# Patient Record
Sex: MALE | Race: WHITE | Employment: FULL TIME | URBAN - METROPOLITAN AREA
[De-identification: names, ages, dates, MRNs, and addresses within clinical notes are randomized per-mention and may not be internally consistent; named-entity substitution may affect disease eponyms.]

---

## 2024-01-18 ENCOUNTER — APPOINTMENT (OUTPATIENT)
Dept: GENERAL RADIOLOGY | Age: 44
End: 2024-01-18
Payer: COMMERCIAL

## 2024-01-18 ENCOUNTER — HOSPITAL ENCOUNTER (EMERGENCY)
Age: 44
Discharge: HOME | End: 2024-01-18
Attending: EMERGENCY MEDICINE
Payer: COMMERCIAL

## 2024-01-18 VITALS
TEMPERATURE: 98.3 F | RESPIRATION RATE: 20 BRPM | HEIGHT: 73 IN | BODY MASS INDEX: 31.06 KG/M2 | HEART RATE: 99 BPM | OXYGEN SATURATION: 97 % | WEIGHT: 234.35 LBS | DIASTOLIC BLOOD PRESSURE: 90 MMHG | SYSTOLIC BLOOD PRESSURE: 122 MMHG

## 2024-01-18 DIAGNOSIS — S93.412A SPRAIN OF CALCANEOFIBULAR LIGAMENT OF LEFT ANKLE, INITIAL ENCOUNTER: Primary | ICD-10-CM

## 2024-01-18 PROCEDURE — 99283 EMERGENCY DEPT VISIT LOW MDM: CPT

## 2024-01-18 PROCEDURE — 73610 X-RAY EXAM OF ANKLE: CPT

## 2024-01-18 RX ORDER — CETIRIZINE HYDROCHLORIDE 10 MG/1
10 TABLET ORAL DAILY
COMMUNITY

## 2024-01-18 ASSESSMENT — PAIN DESCRIPTION - ORIENTATION: ORIENTATION: LEFT

## 2024-01-18 ASSESSMENT — PAIN SCALES - GENERAL: PAINLEVEL_OUTOF10: 0

## 2024-01-18 ASSESSMENT — PAIN - FUNCTIONAL ASSESSMENT: PAIN_FUNCTIONAL_ASSESSMENT: 0-10

## 2024-01-18 ASSESSMENT — PAIN DESCRIPTION - DESCRIPTORS: DESCRIPTORS: ACHING

## 2024-01-18 NOTE — ED NOTES
Discharge instructions reveiwed.  Patient verbalized understanding.  Patient will follow up with orthopedic.  Patient denied need for crutches.

## 2024-01-18 NOTE — ED TRIAGE NOTES
Patient came to ER with complaints of left ankle pain.  Patient stated jumped and landed in a hole and heard a pop and has pain with walking and weight bearing.

## 2024-01-18 NOTE — ED PROVIDER NOTES
unlabored. Speaking comfortably in full sentences.  EXTREMITIES: Moderate soft tissue edema over the left lateral malleolus and inferior to it.  Patient has point tenderness over the left calcaneofibular tendon.  No palpable deformity of the ankle or foot.  SKIN: Warm and dry.  NEUROLOGICAL: Alert and oriented.  Normal sensation to light touch throughout the affected left foot.  Normal motor function of all toes in the left foot    RADIOLOGY  XR ANKLE LEFT (MIN 3 VIEWS)   Final Result   No radiographic evidence of acute osseous abnormality.                ED COURSE/MDM  Patient seen and evaluated.  X-ray reveals no acute fracture, indicating patient's injury is likely an ankle sprain.  Given his point tenderness, I suspect he sprained the calcaneal fibular ligament.  We fitted him with a walker boot and crutches.  I advised him on rest, ice, compression, elevation.  I advised him to follow-up with orthopedics if he is continuing to have pain and swelling in 1 week.  Orthopedic referral provided today. I do feel patient can be safely discharged to home.  Discussed findings and plan with patient.  He acknowledges understanding and is in agreement      Patient Referrals:  [facility]  [address] [address]  [phone]    As needed, If symptoms worsen or new symptoms develop    Loretta Koch MD  [address] [facility]  [address]  [facility] [address]  [phone]    In 1 week  Orthopedics      Discharge Medications:  Discharge Medication List as of 1/18/2024 11:07 AM          FINAL IMPRESSION  1. Sprain of calcaneofibular ligament of left ankle, initial encounter        Blood pressure (!) 122/90, pulse 99, temperature 98.3 °F (36.8 °C), temperature source Tympanic, resp. rate 20, height 1.85 m (6' 0.84\"), weight 106.3 kg (234 lb 5.6 oz), SpO2 97 %.     DISPOSITION  Patient was discharged to home in good condition.    This chart was generated using the Dragon dictation

## 2024-01-31 ENCOUNTER — OFFICE VISIT (OUTPATIENT)
Dept: ORTHOPEDIC SURGERY | Age: 44
End: 2024-01-31
Payer: COMMERCIAL

## 2024-01-31 VITALS — BODY MASS INDEX: 31.15 KG/M2 | WEIGHT: 230 LBS | HEIGHT: 72 IN

## 2024-01-31 DIAGNOSIS — S93.492A SPRAIN OF ANTERIOR TALOFIBULAR LIGAMENT OF LEFT ANKLE, INITIAL ENCOUNTER: Primary | ICD-10-CM

## 2024-01-31 PROCEDURE — 99203 OFFICE O/P NEW LOW 30 MIN: CPT | Performed by: ORTHOPAEDIC SURGERY

## 2024-01-31 NOTE — PROGRESS NOTES
male who presents today in no acute distress, awake, alert, and oriented.  He is well dressed, nourished and  groomed.  Patient with normal affect.  Height is  1.829 m (6'), weight is 104.3 kg (230 lb).  Resting respiratory rate is 16.     Examination of the gait, showed that the patient walks WB left leg .  Examination of both ankles showing a decreased range of motion of the left ankle compare to the other side because of pain.  There is mild swelling that can be seen, no ecchymosis over lateral side of the left ankle.    He has intact sensation and good pedal pulses. He has significant tenderness on deep palpation over the left ankle ATF. The ankles are stable to drawer test bilaterally, equally.  Ankle reflex 1+ bilaterally.     IMAGING:  Xray's dated 1/18/2024 from [facility],  were reviewed, 3 views of the left ankle, and showed no acute fracture. Ankle mortise in anatomic position.     IMPRESSION: Left ankle sprain.    PLAN:  I assured the patient that the xray is negative for acute fracture. The xray findings were reviewed with the patient and explained to his that the pain is 2ry to ankle sprain, and that it should continue to improve the next 3-4 weeks.  He can be WBAT in a boot and can discontinue the boot this week, and avoid heavy impact acitivity and work on peroneal muscle strength. F/U in 4 weeks, PT if needed.      Loretta Koch MD

## 2024-03-06 ENCOUNTER — OFFICE VISIT (OUTPATIENT)
Dept: ORTHOPEDIC SURGERY | Age: 44
End: 2024-03-06
Payer: COMMERCIAL

## 2024-03-06 VITALS — WEIGHT: 229.94 LBS | HEIGHT: 72 IN | BODY MASS INDEX: 31.14 KG/M2

## 2024-03-06 DIAGNOSIS — S93.492A SPRAIN OF ANTERIOR TALOFIBULAR LIGAMENT OF LEFT ANKLE, INITIAL ENCOUNTER: Primary | ICD-10-CM

## 2024-03-06 PROCEDURE — 99214 OFFICE O/P EST MOD 30 MIN: CPT | Performed by: ORTHOPAEDIC SURGERY

## 2024-03-06 NOTE — PROGRESS NOTES
CHIEF COMPLAINT: Left ankle pain/ Ankle ATFL sprain.    DATE OF INJURY: 1/17/2024    HISTORY:  Mr. Zamora 43 y.o.  male presents today for f/u evaluation of a left ankle injury which occurred when he was playing basketball with his sone and left foot caught in a hole rolling his ankle. He is complaining of lateral ankle pain and also burning sensation lateral heel area. He went to [facility] because of the pain. He was told that he has a sprain, boot was applied and asked to f/u with Orthopedics. He reports today mild pain.Rates pain a 3/10 VAS when he is not wearing the boot.  Pain increase with walking and decrease with rest and elevation. No numbness or tingling sensation, and no other complaint.  He works in IT, sitting job.     Past Medical History:   Diagnosis Date    Seasonal allergies         No past surgical history on file.     Social History     Socioeconomic History    Marital status:      Spouse name: Not on file    Number of children: Not on file    Years of education: Not on file    Highest education level: Not on file   Occupational History    Not on file   Tobacco Use    Smoking status: Never    Smokeless tobacco: Never   Vaping Use    Vaping Use: Never used   Substance and Sexual Activity    Alcohol use: Never    Drug use: Never    Sexual activity: Yes     Partners: Female   Other Topics Concern    Not on file   Social History Narrative    Not on file     Social Determinants of Health     Financial Resource Strain: Not on file   Food Insecurity: Not on file   Transportation Needs: Not on file   Physical Activity: Not on file   Stress: Not on file   Social Connections: Not on file   Intimate Partner Violence: Not on file   Housing Stability: Not on file        No family history on file.     Pertinent items are noted in HPI  Review of systems reviewed from Patient History Form and available in the patient's chart under the Media tab.       PHYSICAL EXAM:  Mr. Zamora is a very

## 2024-03-11 ENCOUNTER — HOSPITAL ENCOUNTER (OUTPATIENT)
Dept: PHYSICAL THERAPY | Age: 44
Setting detail: THERAPIES SERIES
Discharge: HOME | End: 2024-03-11
Payer: COMMERCIAL

## 2024-03-11 DIAGNOSIS — S93.492A SPRAIN OF ANTERIOR TALOFIBULAR LIGAMENT OF LEFT ANKLE, INITIAL ENCOUNTER: Primary | ICD-10-CM

## 2024-03-11 PROCEDURE — 97161 PT EVAL LOW COMPLEX 20 MIN: CPT

## 2024-03-11 PROCEDURE — 97530 THERAPEUTIC ACTIVITIES: CPT

## 2024-03-11 PROCEDURE — 97112 NEUROMUSCULAR REEDUCATION: CPT

## 2024-03-11 NOTE — PLAN OF CARE
thumb      OBJECTIVE EXAMINATION   Posture: WFL  Functional Mobility/Transfers:  Independent    Palpation: tenderness noted L ATFL, peroneals      Gait: decreased stride length, lat sway, decreased push off;  sharp pain with partial squat    Single Limb Stance;  (Left):  lat sway, fair-             Right:good    ROM (LEFT) RIGHT   HIP Flex WFL WFL   Knee ext WFL WFL   Knee Flex WFL WFL   Ankle DF 15 20   Ankle PF 40 60   Ankle In - Forefoot 30 30   Ankle In-  Rearfoot 25 20   Ankle Ev - Forefoot 15 30   Ankle Ev - Rearfoot 5 10   Strength  (LEFT) RIGHT   HIP Flexors 5/5 5/5   Knee EXT (quad) 5/5 5/5   Knee Flex (HS) 5/5 5/5   Ankle DF 4-/5 5/5   Ankle PF 2+/5* 5/5   Ankle Inv 3/5 5/5   Ankle EV 2+/5* 5/5        Muscle Control     Gluteals Poor+ Fair   Quadriceps Fair Fair   Hip Adductors Fair- Fair          Sensation:    [x]Dermatomes/Myotomes intact to Light Touch       Joint mobility:    [x]Normal    []Hypo   []Hyper    Orthopedic Special Tests:  DATE:          (Left) Right Comments   Anterior Drawer N N    Posterior Drawer N N    Varus Stress N N    Valgus Stress N N    *N = Normal, A = Abnormal         Exercises/Interventions     Therapeutic Ex (64432)  resistance Sets/time Reps Notes/Cues/Progressions   NuStep       Calf Stretch                                   Manual Intervention (71726)  TIME     Soft Tissue Mobilization                                   NMR re-education (28376) resistance Sets/time Reps CUES NEEDED   Home Exercise Program  X 15 min  See below                               Therapeutic Activity (05308)  Sets/time     Patient education  X 10 min  See below                                 Modalities:    No modalities applied this session    Education:  Discussed at length anatomy and biomechanics of the ankle, muscle reeducation, motor recruitment.   Home Exercise Program:  Patient instructed in TA sets, glut sets, quad sets, isometric hip add, ankle DF, PF, IN, EV, calf stretch with towel ;

## 2024-03-13 ENCOUNTER — HOSPITAL ENCOUNTER (OUTPATIENT)
Dept: PHYSICAL THERAPY | Age: 44
Setting detail: THERAPIES SERIES
Discharge: HOME | End: 2024-03-13
Payer: COMMERCIAL

## 2024-03-13 PROCEDURE — 97140 MANUAL THERAPY 1/> REGIONS: CPT

## 2024-03-13 PROCEDURE — 97110 THERAPEUTIC EXERCISES: CPT

## 2024-03-13 NOTE — FLOWSHEET NOTE
[facility]- Outpatient Rehabilitation and Therapy [address] [place] [address] office: [phone] fax: [phone]       Physical Therapy: TREATMENT/PROGRESS NOTE   Patient: Adelso Zamora (43 y.o. male)   Examination Date: 2024   :  1980 MRN: [id number]   Visit #: 2   Insurance Allowable Auth Needed   BMN []Yes    [x]No    Insurance: Payor: CIGNA / Plan: CIGNA / Product Type: *No Product type* / PT Insurance Information: Cigna, 30% coinsurance, visits based on medical necessity, does not require prior authorization   Insurance ID: [id number] - (Commercial)  Secondary Insurance (if applicable):    Treatment Diagnosis:     ICD-10-CM    1. Sprain of anterior talofibular ligament of left ankle, initial encounter  S93.492A          Medical Diagnosis:  Sprain of anterior talofibular ligament of left ankle, initial encounter [S93.492A]   Referring Physician: Loretta Koch MD  PCP: No primary care provider on file.       Plan of care signed (Y/N): cosign received    Date of Patient follow up with Physician: 24     Progress Report/POC: no  POC update due: (10 visits /OR AUTH LIMITS, whichever is less)  2024                                             Precautions/ Contra-indications:           Latex allergy:  NO  Pacemaker:    NO  Contraindications for Manipulation: None  Date of Surgery:   Other:    Red Flags:  None    C-SSRS Triggered by Intake questionnaire:   [x] No, Questionnaire did not trigger screening.   [] Yes, Patient intake triggered further evaluation      [] C-SSRS Screening completed  [] PCP notified via Plan of Care  [] Emergency services notified     Preferred Language for Healthcare:   [x] English       [] other:    SUBJECTIVE EXAMINATION     Patient stated complaint: Patient reports he was shooting basketball in back yard, came down on L foot, rolled it, heard a pop, was unable to bear weight on it.  Patient went to ED, put in boot, saw Dr. Koch, karolina with

## 2024-03-19 ENCOUNTER — HOSPITAL ENCOUNTER (OUTPATIENT)
Dept: PHYSICAL THERAPY | Age: 44
Setting detail: THERAPIES SERIES
Discharge: HOME | End: 2024-03-19
Payer: COMMERCIAL

## 2024-03-19 PROCEDURE — 97112 NEUROMUSCULAR REEDUCATION: CPT

## 2024-03-19 PROCEDURE — 97140 MANUAL THERAPY 1/> REGIONS: CPT

## 2024-03-19 PROCEDURE — 97110 THERAPEUTIC EXERCISES: CPT

## 2024-03-19 NOTE — FLOWSHEET NOTE
[facility]- Outpatient Rehabilitation and Therapy [address] [place] [address] office: [phone] fax: [phone]       Physical Therapy: TREATMENT/PROGRESS NOTE   Patient: Adelso Zamora (43 y.o. male)   Examination Date: 2024   :  1980 MRN: [id number]   Visit #: 3   Insurance Allowable Auth Needed   BMN []Yes    [x]No    Insurance: Payor: CIGNA / Plan: CIGNA / Product Type: *No Product type* / PT Insurance Information: Cigna, 30% coinsurance, visits based on medical necessity, does not require prior authorization   Insurance ID: [id number] - (Commercial)  Secondary Insurance (if applicable):    Treatment Diagnosis:     ICD-10-CM    1. Sprain of anterior talofibular ligament of left ankle, initial encounter  S93.492A          Medical Diagnosis:  Sprain of anterior talofibular ligament of left ankle, initial encounter [S93.492A]   Referring Physician: Loretta Koch MD  PCP: No primary care provider on file.       Plan of care signed (Y/N): cosign received    Date of Patient follow up with Physician: 24     Progress Report/POC: no  POC update due: (10 visits /OR AUTH LIMITS, whichever is less)  2024                                             Precautions/ Contra-indications:           Latex allergy:  NO  Pacemaker:    NO  Contraindications for Manipulation: None  Date of Surgery:   Other:    Red Flags:  None    C-SSRS Triggered by Intake questionnaire:   [x] No, Questionnaire did not trigger screening.   [] Yes, Patient intake triggered further evaluation      [] C-SSRS Screening completed  [] PCP notified via Plan of Care  [] Emergency services notified     Preferred Language for Healthcare:   [x] English       [] other:    SUBJECTIVE EXAMINATION     Patient stated complaint: Patient reports he was shooting basketball in back yard, came down on L foot, rolled it, heard a pop, was unable to bear weight on it.  Patient went to ED, put in boot, saw Dr. Koch, karolina with

## 2024-03-22 ENCOUNTER — HOSPITAL ENCOUNTER (OUTPATIENT)
Dept: PHYSICAL THERAPY | Age: 44
Setting detail: THERAPIES SERIES
Discharge: HOME | End: 2024-03-22
Payer: COMMERCIAL

## 2024-03-22 PROCEDURE — 97112 NEUROMUSCULAR REEDUCATION: CPT

## 2024-03-22 PROCEDURE — 97140 MANUAL THERAPY 1/> REGIONS: CPT

## 2024-03-22 PROCEDURE — 97110 THERAPEUTIC EXERCISES: CPT

## 2024-03-22 NOTE — FLOWSHEET NOTE
[facility]- Outpatient Rehabilitation and Therapy [address] [place] [address] office: [phone] fax: [phone]       Physical Therapy: TREATMENT/PROGRESS NOTE   Patient: Adelso Zamora (43 y.o. male)   Examination Date: 2024   :  1980 MRN: [id number]   Visit #: 4   Insurance Allowable Auth Needed   BMN []Yes    [x]No    Insurance: Payor: CIGNA / Plan: CIGNA / Product Type: *No Product type* / PT Insurance Information: Cigna, 30% coinsurance, visits based on medical necessity, does not require prior authorization   Insurance ID: [id number] - (Commercial)  Secondary Insurance (if applicable):    Treatment Diagnosis:     ICD-10-CM    1. Sprain of anterior talofibular ligament of left ankle, initial encounter  S93.492A          Medical Diagnosis:  Sprain of anterior talofibular ligament of left ankle, initial encounter [S93.492A]   Referring Physician: Loretta Koch MD  PCP: No primary care provider on file.       Plan of care signed (Y/N): cosign received    Date of Patient follow up with Physician: 24     Progress Report/POC: no  POC update due: (10 visits /OR AUTH LIMITS, whichever is less)  2024                                             Precautions/ Contra-indications:           Latex allergy:  NO  Pacemaker:    NO  Contraindications for Manipulation: None  Date of Surgery:   Other:    Red Flags:  None    C-SSRS Triggered by Intake questionnaire:   [x] No, Questionnaire did not trigger screening.   [] Yes, Patient intake triggered further evaluation      [] C-SSRS Screening completed  [] PCP notified via Plan of Care  [] Emergency services notified     Preferred Language for Healthcare:   [x] English       [] other:    SUBJECTIVE EXAMINATION     Patient stated complaint: Patient reports he was shooting basketball in back yard, came down on L foot, rolled it, heard a pop, was unable to bear weight on it.  Patient went to ED, put in boot, saw Dr. Koch, karolina with

## 2024-03-26 ENCOUNTER — HOSPITAL ENCOUNTER (OUTPATIENT)
Dept: PHYSICAL THERAPY | Age: 44
Setting detail: THERAPIES SERIES
Discharge: HOME | End: 2024-03-26
Payer: COMMERCIAL

## 2024-03-26 PROCEDURE — 97112 NEUROMUSCULAR REEDUCATION: CPT

## 2024-03-26 PROCEDURE — 97140 MANUAL THERAPY 1/> REGIONS: CPT

## 2024-03-26 PROCEDURE — 97110 THERAPEUTIC EXERCISES: CPT

## 2024-03-26 NOTE — FLOWSHEET NOTE
Met: [] Adjusted  Patient will have a decrease in pain to 0/10 to help facilitate improvement in movement, function, and ADLs as indicated by functional deficits.   Status: [] Progressing: [] Met: [] Not Met: [] Adjusted    Long Term Goals: To be achieved in: 4-6 weeks  Disability index score of 7% or less for the WOMAC to assist with return top prior level of function.   Status: [] Progressing: [] Met: [] Not Met: [] Adjusted  Improve ankle AROM to WFL to allow for proper joint functioning as indicated by patients functional deficits.  Status: [] Progressing: [] Met: [] Not Met: [] Adjusted  Pt to improve strength to show motor control/activation of proximal hip, TrA/abdominal, posterior chain LE, quadriceps, gastroc/soleus, and hamstrings to facilitate functional mobility and ADLs.    Status: [] Progressing: [] Met: [] Not Met: [] Adjusted  Patient will return to  Usual recreational activities, squatting, heavy home activities, walk 1 mile, hopping, and running  without increased symptoms or restriction to work towards return to prior level of function.        Status: [] Progressing: [] Met: [] Not Met: [] Adjusted  Patient will resume normal work/leisure activities without pain.            Status: [] Progressing: [] Met: [] Not Met: [] Adjusted    TREATMENT PLAN     Plan: Monitor response. Tband INV/EVR    Electronically Signed by Eli Chandra PTA   Date: 03/26/2024     Note: Portions of this note have been templated and/or copied from initial evaluation, reassessments and prior notes for documentation efficiency.

## 2024-03-29 ENCOUNTER — HOSPITAL ENCOUNTER (OUTPATIENT)
Dept: PHYSICAL THERAPY | Age: 44
Setting detail: THERAPIES SERIES
Discharge: HOME | End: 2024-03-29
Payer: COMMERCIAL

## 2024-03-29 PROCEDURE — 97110 THERAPEUTIC EXERCISES: CPT

## 2024-03-29 PROCEDURE — 97112 NEUROMUSCULAR REEDUCATION: CPT

## 2024-03-29 PROCEDURE — 97140 MANUAL THERAPY 1/> REGIONS: CPT

## 2024-03-29 NOTE — FLOWSHEET NOTE
DF, PF, IN, EV  X 30 sec each X 3 reclined   AROM>ARROM - DF, PF, IN, Ev Min resist X 1 each X 10 reclined          NMR re-education (18763) resistance Sets/time Reps CUES NEEDED   Home Exercise Program    See below   Leg press     Heel raises 120#  80# 3  3 X10  x10    BAPS  cw/ccw L2  x15    Meena: INV/EVR    Neutral and end range 5\" 5x ea           Therapeutic Activity (09291)  Sets/time     Patient education    See below                        Kinesiotape ant/lat malleolus     3/29: Assess     Modalities:    No modalities applied this session    Education:  Discussed at length anatomy and biomechanics of the ankle, muscle reeducation, motor recruitment.   Home Exercise Program:  Patient instructed in TA sets, glut sets, quad sets, isometric hip add, ankle DF, PF, IN, EV, calf stretch with towel ; written instructions with pictures issued, patient able to demonstrate exercises.   3/29: Inversion and eversion with red Tband        ASSESSMENT   Assessment:   Adelso Zamora is a 43 y.o. male presenting today to Outpatient PT with signs and symptoms consistent with sprain L ankle and muscle imbalance of the core and B LEs .     Pt. presents with the functional impairments and activity limitations listed below and would benefit from Outpatient PT to address the below impairments as well as improve pain, and restore function.     Today's Assessment: Patient tolerated treatment well. Mild/mod tenderness distal peroneals and ant tib    Medical Necessity Documentation:  I certify that this patient meets the below criteria necessary for medical necessity for care and/or justification of therapy services:  The patient has functional impairments and/or activity limitations and would benefit from continued outpatient therapy services to address the deficits outlined in the patients goals  The patient has a musculoskeletal condition(s) with a corresponding ICD-10 code that is of complexity and severity that require skilled

## 2024-04-01 ENCOUNTER — HOSPITAL ENCOUNTER (OUTPATIENT)
Dept: PHYSICAL THERAPY | Age: 44
Setting detail: THERAPIES SERIES
Discharge: HOME | End: 2024-04-01
Payer: COMMERCIAL

## 2024-04-01 PROCEDURE — 97110 THERAPEUTIC EXERCISES: CPT

## 2024-04-01 PROCEDURE — 97140 MANUAL THERAPY 1/> REGIONS: CPT

## 2024-04-01 PROCEDURE — 97112 NEUROMUSCULAR REEDUCATION: CPT

## 2024-04-01 NOTE — FLOWSHEET NOTE
[facility]- Outpatient Rehabilitation and Therapy [address] [place] [address] office: [phone] fax: [phone]       Physical Therapy: TREATMENT/PROGRESS NOTE   Patient: Adelso Zamora (43 y.o. male)   Examination Date: 2024   :  1980 MRN: [id number]   Visit #: 7   Insurance Allowable Auth Needed   BMN []Yes    [x]No    Insurance: Payor: CIGNA / Plan: CIGNA / Product Type: *No Product type* / PT Insurance Information: Cigna, 30% coinsurance, visits based on medical necessity, does not require prior authorization   Insurance ID: [id number] - (Commercial)  Secondary Insurance (if applicable):    Treatment Diagnosis:     ICD-10-CM    1. Sprain of anterior talofibular ligament of left ankle, initial encounter  S93.492A          Medical Diagnosis:  Sprain of anterior talofibular ligament of left ankle, initial encounter [S93.492A]   Referring Physician: Loretta Koch MD  PCP: No primary care provider on file.       Plan of care signed (Y/N): cosign received    Date of Patient follow up with Physician: 24     Progress Report/POC: no  POC update due: (10 visits /OR AUTH LIMITS, whichever is less)  2024                                             Precautions/ Contra-indications:           Latex allergy:  NO  Pacemaker:    NO  Contraindications for Manipulation: None  Date of Surgery:   Other:    Red Flags:  None    C-SSRS Triggered by Intake questionnaire:   [x] No, Questionnaire did not trigger screening.   [] Yes, Patient intake triggered further evaluation      [] C-SSRS Screening completed  [] PCP notified via Plan of Care  [] Emergency services notified     Preferred Language for Healthcare:   [x] English       [] other:    SUBJECTIVE EXAMINATION     Patient stated complaint: Patient reports he was shooting basketball in back yard, came down on L foot, rolled it, heard a pop, was unable to bear weight on it.  Patient went to ED, put in boot, saw Dr. Koch, karolina with

## 2024-04-04 ENCOUNTER — HOSPITAL ENCOUNTER (OUTPATIENT)
Dept: PHYSICAL THERAPY | Age: 44
Setting detail: THERAPIES SERIES
Discharge: HOME | End: 2024-04-04
Payer: COMMERCIAL

## 2024-04-04 PROCEDURE — 97140 MANUAL THERAPY 1/> REGIONS: CPT

## 2024-04-04 PROCEDURE — 97110 THERAPEUTIC EXERCISES: CPT

## 2024-04-04 PROCEDURE — 97112 NEUROMUSCULAR REEDUCATION: CPT

## 2024-04-04 NOTE — FLOWSHEET NOTE
flexibility, following either an injury or surgery.   (40142) NEUROMUSCULAR RE-EDUCATION - Therapeutic procedure, 1 or more areas, each 15 minutes; neuromuscular reeducation of movement, balance, coordination, kinesthetic sense, posture, and/or proprioception for sitting and/or standing activities  (59005) MANUAL THERAPY -  Manual therapy techniques, 1 or more regions, each 15 minutes (Mobilization/manipulation, manual lymphatic drainage, manual traction) for the purpose of modulating pain, promoting relaxation,  increasing ROM, reducing/eliminating soft tissue swelling/inflammation/restriction, improving soft tissue extensibility and allowing for proper ROM for normal function with self care, mobility, lifting and ambulation      GOALS     Patient stated goal: \"Get back to normal\"  Status:  [] Progressing: [] Met: [] Not Met: [] Adjusted    Therapist goals for Patient:   Short Term Goals: To be achieved in: 2 weeks  Independent in HEP and progression per patient tolerance, in order to progress toward full function and prevent re-injury.    Status: [] Progressing: [] Met: [] Not Met: [] Adjusted  Patient will have a decrease in pain to 0/10 to help facilitate improvement in movement, function, and ADLs as indicated by functional deficits.   Status: [] Progressing: [] Met: [] Not Met: [] Adjusted    Long Term Goals: To be achieved in: 4-6 weeks  Disability index score of 7% or less for the WOMAC to assist with return top prior level of function.   Status: [] Progressing: [] Met: [] Not Met: [] Adjusted  Improve ankle AROM to WFL to allow for proper joint functioning as indicated by patients functional deficits.  Status: [] Progressing: [] Met: [] Not Met: [] Adjusted  Pt to improve strength to show motor control/activation of proximal hip, TrA/abdominal, posterior chain LE, quadriceps, gastroc/soleus, and hamstrings to facilitate functional mobility and ADLs.    Status: [] Progressing: [] Met: [] Not Met: []

## 2024-04-08 ENCOUNTER — HOSPITAL ENCOUNTER (OUTPATIENT)
Dept: PHYSICAL THERAPY | Age: 44
Setting detail: THERAPIES SERIES
Discharge: HOME | End: 2024-04-08
Payer: COMMERCIAL

## 2024-04-08 PROCEDURE — 97112 NEUROMUSCULAR REEDUCATION: CPT

## 2024-04-08 PROCEDURE — 97140 MANUAL THERAPY 1/> REGIONS: CPT

## 2024-04-08 PROCEDURE — 97110 THERAPEUTIC EXERCISES: CPT

## 2024-04-08 NOTE — FLOWSHEET NOTE
[facility]- Outpatient Rehabilitation and Therapy [address] [place] [address] office: [phone] fax: [phone]       Physical Therapy: TREATMENT/PROGRESS NOTE   Patient: Adelso Zamora (43 y.o. male)   Examination Date: 2024   :  1980 MRN: [id number]   Visit #: 9   Insurance Allowable Auth Needed   BMN []Yes    [x]No    Insurance: Payor: CIGNA / Plan: CIGNA / Product Type: *No Product type* / PT Insurance Information: Cigna, 30% coinsurance, visits based on medical necessity, does not require prior authorization   Insurance ID: [id number] - (Commercial)  Secondary Insurance (if applicable):    Treatment Diagnosis:     ICD-10-CM    1. Sprain of anterior talofibular ligament of left ankle, initial encounter  S93.492A          Medical Diagnosis:  Sprain of anterior talofibular ligament of left ankle, initial encounter [S93.492A]   Referring Physician: Loretta Koch MD  PCP: No primary care provider on file.       Plan of care signed (Y/N): cosign received    Date of Patient follow up with Physician: 24     Progress Report/POC: no  POC update due: (10 visits /OR AUTH LIMITS, whichever is less)  2024                                             Precautions/ Contra-indications:           Latex allergy:  NO  Pacemaker:    NO  Contraindications for Manipulation: None  Date of Surgery:   Other:    Red Flags:  None    C-SSRS Triggered by Intake questionnaire:   [x] No, Questionnaire did not trigger screening.   [] Yes, Patient intake triggered further evaluation      [] C-SSRS Screening completed  [] PCP notified via Plan of Care  [] Emergency services notified     Preferred Language for Healthcare:   [x] English       [] other:    SUBJECTIVE EXAMINATION     Patient stated complaint: Patient reports he was shooting basketball in back yard, came down on L foot, rolled it, heard a pop, was unable to bear weight on it.  Patient went to ED, put in boot, saw Dr. Koch, karolina with

## 2024-04-11 ENCOUNTER — HOSPITAL ENCOUNTER (OUTPATIENT)
Dept: PHYSICAL THERAPY | Age: 44
Setting detail: THERAPIES SERIES
Discharge: HOME | End: 2024-04-11
Payer: COMMERCIAL

## 2024-04-11 PROCEDURE — 97110 THERAPEUTIC EXERCISES: CPT

## 2024-04-11 PROCEDURE — 97140 MANUAL THERAPY 1/> REGIONS: CPT

## 2024-04-11 PROCEDURE — 97112 NEUROMUSCULAR REEDUCATION: CPT

## 2024-04-11 NOTE — FLOWSHEET NOTE
gastroc/soleus, and hamstrings to facilitate functional mobility and ADLs.    Status: [] Progressing: [] Met: [] Not Met: [] Adjusted  Patient will return to  Usual recreational activities, squatting, heavy home activities, walk 1 mile, hopping, and running  without increased symptoms or restriction to work towards return to prior level of function.        Status: [] Progressing: [] Met: [] Not Met: [] Adjusted  Patient will resume normal work/leisure activities without pain.            Status: [] Progressing: [] Met: [] Not Met: [] Adjusted    TREATMENT PLAN     Plan: Monitor response. Initiate ankle alphabet if able for HEP.  Continue to focus on soft tissue mobility.  Electronically Signed by Eli Chandra, [id number]   Date: 04/11/2024     Note: Portions of this note have been templated and/or copied from initial evaluation, reassessments and prior notes for documentation efficiency.

## 2024-04-15 ENCOUNTER — HOSPITAL ENCOUNTER (OUTPATIENT)
Dept: PHYSICAL THERAPY | Age: 44
Setting detail: THERAPIES SERIES
Discharge: HOME | End: 2024-04-15
Payer: COMMERCIAL

## 2024-04-15 PROCEDURE — 97112 NEUROMUSCULAR REEDUCATION: CPT

## 2024-04-15 PROCEDURE — 97140 MANUAL THERAPY 1/> REGIONS: CPT

## 2024-04-15 PROCEDURE — 97110 THERAPEUTIC EXERCISES: CPT

## 2024-04-15 NOTE — PLAN OF CARE
[facility]- Outpatient Rehabilitation and Therapy [address] [place] [address] office: [phone] fax: [phone]         Physical Therapy Re-Certification Plan of Care/MD UPDATE      Dear Dr. Koch,    We had the pleasure of treating the following patient for physical therapy services at [facility].  A summary of our findings can be found in the updated assessment below.  This includes our plan of care.  If you have any questions or concerns regarding these findings, please do not hesitate to contact me at the office phone number checked above.  Thank you for the referral.     Physician Signature:________________________________Date:__________________  By signing above (or electronic signature), therapist’s plan is approved by physician    Date Range Of Visits: 3/11/24 thru 4/15/24  Total Visits to Date: 11  Overall Response to Treatment:   [x]Patient is responding well to treatment and improvement is noted with regards  to goals   []Patient should continue to improve in reasonable time if they continue HEP   []Patient has plateaued and is no longer responding to skilled PT intervention    []Patient is getting worse and would benefit from return to referring MD   []Patient unable to adhere to initial POC   [x]Other: Patient would benefit from continued PT to increase functional mobility.    Recommendation:    [x]Continue PT 1-2x / wk for 4 weeks.    []Hold PT, pending MD visit   Physical Therapy: TREATMENT/PROGRESS NOTE   Patient: Adelso Zamora (43 y.o. male)   Examination Date: 04/15/2024   :  1980 MRN: [id number]   Visit #: 11   Insurance Allowable Auth Needed   BMN []Yes    [x]No    Insurance: Payor: CIGNA / Plan: CIGNA / Product Type: *No Product type* / PT Insurance Information: Cigna, 30% coinsurance, visits based on medical necessity, does not require prior authorization   Insurance ID: [id number] - (Commercial)  Secondary Insurance (if applicable):

## 2024-04-17 ENCOUNTER — OFFICE VISIT (OUTPATIENT)
Dept: ORTHOPEDIC SURGERY | Age: 44
End: 2024-04-17
Payer: COMMERCIAL

## 2024-04-17 VITALS — BODY MASS INDEX: 31.02 KG/M2 | HEIGHT: 72 IN | WEIGHT: 229 LBS

## 2024-04-17 DIAGNOSIS — S93.492A SPRAIN OF ANTERIOR TALOFIBULAR LIGAMENT OF LEFT ANKLE, INITIAL ENCOUNTER: Primary | ICD-10-CM

## 2024-04-17 PROCEDURE — 99213 OFFICE O/P EST LOW 20 MIN: CPT | Performed by: NURSE PRACTITIONER

## 2024-04-17 NOTE — PROGRESS NOTES
CHIEF COMPLAINT: Left ankle pain/ Ankle ATFL sprain.    DATE OF INJURY: 1/17/2024    HISTORY:  Adelso Zamora 43 y.o. male presents today for f/u evaluation of a left ankle injury which occurred when he was playing basketball with his son and his left foot caught in a hole rolling his ankle. He is complaining of lateral ankle pain and also burning sensation lateral heel area. He went to [facility] because of the pain. He was told that he has a sprain, boot was applied and asked to f/u with Orthopedics. He has been working with PT and reports a 75% improvement.  He reports today no pain, but does feel some laxity intermittently when walking uneven surface. Rates pain a 0/10 VAS No numbness or tingling sensation, and no other complaint.  He works in IT, sitting job.     Past Medical History:   Diagnosis Date    Seasonal allergies      No past surgical history on file.  No family history on file.  Social History     Socioeconomic History    Marital status:      Spouse name: Not on file    Number of children: Not on file    Years of education: Not on file    Highest education level: Not on file   Occupational History    Not on file   Tobacco Use    Smoking status: Never    Smokeless tobacco: Never   Vaping Use    Vaping Use: Never used   Substance and Sexual Activity    Alcohol use: Never    Drug use: Never    Sexual activity: Yes     Partners: Female   Other Topics Concern    Not on file   Social History Narrative    Not on file     Social Determinants of Health     Financial Resource Strain: Not on file   Food Insecurity: Not on file   Transportation Needs: Not on file   Physical Activity: Not on file   Stress: Not on file   Social Connections: Not on file   Intimate Partner Violence: Not on file   Housing Stability: Not on file     Current Outpatient Medications   Medication Sig Dispense Refill    cetirizine (ZYRTEC) 10 MG tablet Take 1 tablet by mouth daily       No current

## 2024-04-22 ENCOUNTER — HOSPITAL ENCOUNTER (OUTPATIENT)
Dept: PHYSICAL THERAPY | Age: 44
Setting detail: THERAPIES SERIES
Discharge: HOME | End: 2024-04-22
Payer: COMMERCIAL

## 2024-04-22 PROCEDURE — 97112 NEUROMUSCULAR REEDUCATION: CPT

## 2024-04-22 PROCEDURE — 97110 THERAPEUTIC EXERCISES: CPT

## 2024-04-22 PROCEDURE — 97140 MANUAL THERAPY 1/> REGIONS: CPT

## 2024-04-22 NOTE — FLOWSHEET NOTE
[facility]- Outpatient Rehabilitation and Therapy [address] [place] [address] office: [phone] fax: [phone]           Physical Therapy: TREATMENT/PROGRESS NOTE   Patient: Adelso Zamora (43 y.o. male)   Examination Date: 2024   :  1980 MRN: [id number]   Visit #: 12   Insurance Allowable Auth Needed   BMN []Yes    [x]No    Insurance: Payor: CIGNA / Plan: CIGNA / Product Type: *No Product type* / PT Insurance Information: Cigna, 30% coinsurance, visits based on medical necessity, does not require prior authorization   Insurance ID: [id number] - (Commercial)  Secondary Insurance (if applicable):    Treatment Diagnosis:     ICD-10-CM    1. Sprain of anterior talofibular ligament of left ankle, initial encounter  S93.492A          Medical Diagnosis:  Sprain of anterior talofibular ligament of left ankle, initial encounter [S93.492A]   Referring Physician: Loretta Koch MD  PCP: No primary care provider on file.       Plan of care signed (Y/N): cosign received    Date of Patient follow up with Physician: 24     Progress Report/POC: no  POC update due: (10 visits /OR AUTH LIMITS, whichever is less)  2024                                             Precautions/ Contra-indications:           Latex allergy:  NO  Pacemaker:    NO  Contraindications for Manipulation: None  Date of Surgery:   Other:    Red Flags:  None    C-SSRS Triggered by Intake questionnaire:   [x] No, Questionnaire did not trigger screening.   [] Yes, Patient intake triggered further evaluation      [] C-SSRS Screening completed  [] PCP notified via Plan of Care  [] Emergency services notified     Preferred Language for Healthcare:   [x] English       [] other:    SUBJECTIVE EXAMINATION     Patient stated complaint: Patient reports he was shooting basketball in back yard, came down on L foot, rolled it, heard a pop, was unable to bear weight on it.  Patient went to ED, put in boot, saw Dr. Koch, cont

## 2024-04-25 ENCOUNTER — HOSPITAL ENCOUNTER (OUTPATIENT)
Dept: PHYSICAL THERAPY | Age: 44
Setting detail: THERAPIES SERIES
Discharge: HOME | End: 2024-04-25
Payer: COMMERCIAL

## 2024-04-25 PROCEDURE — 97110 THERAPEUTIC EXERCISES: CPT

## 2024-04-25 PROCEDURE — 97112 NEUROMUSCULAR REEDUCATION: CPT

## 2024-04-25 PROCEDURE — 97140 MANUAL THERAPY 1/> REGIONS: CPT

## 2024-04-25 NOTE — FLOWSHEET NOTE
[facility]- Outpatient Rehabilitation and Therapy [address] [place] [address] office: [phone] fax: [phone]           Physical Therapy: TREATMENT/PROGRESS NOTE   Patient: Adelso Zamora (43 y.o. male)   Examination Date: 2024   :  1980 MRN: [id number]   Visit #: 13   Insurance Allowable Auth Needed   BMN []Yes    [x]No    Insurance: Payor: CIGNA / Plan: CIGNA / Product Type: *No Product type* / PT Insurance Information: Cigna, 30% coinsurance, visits based on medical necessity, does not require prior authorization   Insurance ID: [id number] - (Commercial)  Secondary Insurance (if applicable):    Treatment Diagnosis:     ICD-10-CM    1. Sprain of anterior talofibular ligament of left ankle, initial encounter  S93.492A          Medical Diagnosis:  Sprain of anterior talofibular ligament of left ankle, initial encounter [S93.492A]   Referring Physician: Loretta Koch MD  PCP: No primary care provider on file.       Plan of care signed (Y/N): cosign received    Date of Patient follow up with Physician: 24     Progress Report/POC: no  POC update due: (10 visits /OR AUTH LIMITS, whichever is less)  2024                                             Precautions/ Contra-indications:           Latex allergy:  NO  Pacemaker:    NO  Contraindications for Manipulation: None  Date of Surgery:   Other:    Red Flags:  None    C-SSRS Triggered by Intake questionnaire:   [x] No, Questionnaire did not trigger screening.   [] Yes, Patient intake triggered further evaluation      [] C-SSRS Screening completed  [] PCP notified via Plan of Care  [] Emergency services notified     Preferred Language for Healthcare:   [x] English       [] other:    SUBJECTIVE EXAMINATION     Patient stated complaint: Patient reports he was shooting basketball in back yard, came down on L foot, rolled it, heard a pop, was unable to bear weight on it.  Patient went to ED, put in boot, saw Dr. Koch, cont

## 2024-04-29 ENCOUNTER — HOSPITAL ENCOUNTER (OUTPATIENT)
Dept: PHYSICAL THERAPY | Age: 44
Setting detail: THERAPIES SERIES
Discharge: HOME | End: 2024-04-29
Payer: COMMERCIAL

## 2024-04-29 PROCEDURE — 97140 MANUAL THERAPY 1/> REGIONS: CPT

## 2024-04-29 PROCEDURE — 97112 NEUROMUSCULAR REEDUCATION: CPT

## 2024-04-29 PROCEDURE — 97110 THERAPEUTIC EXERCISES: CPT

## 2024-04-29 NOTE — FLOWSHEET NOTE
[facility]- Outpatient Rehabilitation and Therapy [address] [place] [address] office: [phone] fax: [phone]           Physical Therapy: TREATMENT/PROGRESS NOTE   Patient: Adelso Zamora (43 y.o. male)   Examination Date: 2024   :  1980 MRN: [id number]   Visit #: 14   Insurance Allowable Auth Needed   BMN []Yes    [x]No    Insurance: Payor: CIGNA / Plan: CIGNA / Product Type: *No Product type* / PT Insurance Information: Cigna, 30% coinsurance, visits based on medical necessity, does not require prior authorization   Insurance ID: [id number] - (Commercial)  Secondary Insurance (if applicable):    Treatment Diagnosis:     ICD-10-CM    1. Sprain of anterior talofibular ligament of left ankle, initial encounter  S93.492A          Medical Diagnosis:  Sprain of anterior talofibular ligament of left ankle, initial encounter [S93.492A]   Referring Physician: Loretta Koch MD  PCP: No primary care provider on file.       Plan of care signed (Y/N): cosign received, recert received    Date of Patient follow up with Physician: 24     Progress Report/POC: no  POC update due: (10 visits /OR AUTH LIMITS, whichever is less)  2024                                             Precautions/ Contra-indications:           Latex allergy:  NO  Pacemaker:    NO  Contraindications for Manipulation: None  Date of Surgery:   Other:    Red Flags:  None    C-SSRS Triggered by Intake questionnaire:   [x] No, Questionnaire did not trigger screening.   [] Yes, Patient intake triggered further evaluation      [] C-SSRS Screening completed  [] PCP notified via Plan of Care  [] Emergency services notified     Preferred Language for Healthcare:   [x] English       [] other:    SUBJECTIVE EXAMINATION     Patient stated complaint: Patient reports he was shooting basketball in back yard, came down on L foot, rolled it, heard a pop, was unable to bear weight on it.  Patient went to ED, put in boot, saw

## 2024-05-02 ENCOUNTER — HOSPITAL ENCOUNTER (OUTPATIENT)
Dept: PHYSICAL THERAPY | Age: 44
Setting detail: THERAPIES SERIES
Discharge: HOME | End: 2024-05-02
Payer: COMMERCIAL

## 2024-05-02 PROCEDURE — 97110 THERAPEUTIC EXERCISES: CPT

## 2024-05-02 PROCEDURE — 97140 MANUAL THERAPY 1/> REGIONS: CPT

## 2024-05-02 PROCEDURE — 97112 NEUROMUSCULAR REEDUCATION: CPT

## 2024-05-02 NOTE — FLOWSHEET NOTE
Dry Needle 1-2 muscle (91018)     Aquatic Therex (51386)    Dry Needle 3+ muscle (42617)     Iontophoresis (42877)    VASO (89550)     Ultrasound (90133)    Group Therapy (37592)     Estim Attended (27179)    Canalith Repositioning (58874)     Other:    Other:    Total Timed Code Tx Minutes 43 3       Total Treatment Minutes 43        Charge Justification:  (53092) THERAPEUTIC EXERCISE - Provided verbal/tactile cueing for activities related to strengthening, flexibility, endurance, ROM performed to prevent loss of range of motion, maintain or improve muscular strength or increase flexibility, following either an injury or surgery.   (76206) NEUROMUSCULAR RE-EDUCATION - Therapeutic procedure, 1 or more areas, each 15 minutes; neuromuscular reeducation of movement, balance, coordination, kinesthetic sense, posture, and/or proprioception for sitting and/or standing activities  (36669) MANUAL THERAPY -  Manual therapy techniques, 1 or more regions, each 15 minutes (Mobilization/manipulation, manual lymphatic drainage, manual traction) for the purpose of modulating pain, promoting relaxation,  increasing ROM, reducing/eliminating soft tissue swelling/inflammation/restriction, improving soft tissue extensibility and allowing for proper ROM for normal function with self care, mobility, lifting and ambulation      GOALS     Patient stated goal: \"Get back to normal\"  Status:  [] Progressing: [] Met: [] Not Met: [] Adjusted    Therapist goals for Patient:   Short Term Goals: To be achieved in: 2 weeks  Independent in HEP and progression per patient tolerance, in order to progress toward full function and prevent re-injury.    Status: [] Progressing: [] Met: [] Not Met: [] Adjusted  Patient will have a decrease in pain to 0/10 to help facilitate improvement in movement, function, and ADLs as indicated by functional deficits.   Status: [] Progressing: [] Met: [] Not Met: [] Adjusted    Long Term Goals: To be achieved in: 4-6

## 2024-05-06 ENCOUNTER — HOSPITAL ENCOUNTER (OUTPATIENT)
Dept: PHYSICAL THERAPY | Age: 44
Setting detail: THERAPIES SERIES
End: 2024-05-06
Payer: COMMERCIAL

## 2024-05-09 ENCOUNTER — HOSPITAL ENCOUNTER (OUTPATIENT)
Dept: PHYSICAL THERAPY | Age: 44
Setting detail: THERAPIES SERIES
Discharge: HOME | End: 2024-05-09
Payer: COMMERCIAL

## 2024-05-09 PROCEDURE — 97110 THERAPEUTIC EXERCISES: CPT

## 2024-05-09 PROCEDURE — 97140 MANUAL THERAPY 1/> REGIONS: CPT

## 2024-05-09 PROCEDURE — 97112 NEUROMUSCULAR REEDUCATION: CPT

## 2024-05-09 NOTE — FLOWSHEET NOTE
[facility]- Outpatient Rehabilitation and Therapy [address] [place] [address] office: [phone] fax: [phone]           Physical Therapy: TREATMENT/PROGRESS NOTE   Patient: Adelso Zamora (43 y.o. male)   Examination Date: 2024   :  1980 MRN: [id number]   Visit #: 16   Insurance Allowable Auth Needed   BMN []Yes    [x]No    Insurance: Payor: CIGNA / Plan: CIGNA / Product Type: *No Product type* / PT Insurance Information: Cigna, 30% coinsurance, visits based on medical necessity, does not require prior authorization   Insurance ID: [id number] - (Commercial)  Secondary Insurance (if applicable):    Treatment Diagnosis:     ICD-10-CM    1. Sprain of anterior talofibular ligament of left ankle, initial encounter  S93.492A          Medical Diagnosis:  Sprain of anterior talofibular ligament of left ankle, initial encounter [S93.492A]   Referring Physician: Loretta Koch MD  PCP: No primary care provider on file.       Plan of care signed (Y/N): cosign received, recert received    Date of Patient follow up with Physician: 24     Progress Report/POC: no  POC update due: (10 visits /OR AUTH LIMITS, whichever is less)  2024                                             Precautions/ Contra-indications:           Latex allergy:  NO  Pacemaker:    NO  Contraindications for Manipulation: None  Date of Surgery:   Other:    Red Flags:  None    C-SSRS Triggered by Intake questionnaire:   [x] No, Questionnaire did not trigger screening.   [] Yes, Patient intake triggered further evaluation      [] C-SSRS Screening completed  [] PCP notified via Plan of Care  [] Emergency services notified     Preferred Language for Healthcare:   [x] English       [] other:    SUBJECTIVE EXAMINATION     Patient stated complaint: Patient reports he was shooting basketball in back yard, came down on L foot, rolled it, heard a pop, was unable to bear weight on it.  Patient went to ED, put in boot, saw

## 2024-05-13 ENCOUNTER — HOSPITAL ENCOUNTER (OUTPATIENT)
Dept: PHYSICAL THERAPY | Age: 44
Setting detail: THERAPIES SERIES
Discharge: HOME | End: 2024-05-13
Payer: COMMERCIAL

## 2024-05-13 PROCEDURE — 97140 MANUAL THERAPY 1/> REGIONS: CPT

## 2024-05-13 PROCEDURE — 97110 THERAPEUTIC EXERCISES: CPT

## 2024-05-13 PROCEDURE — 97112 NEUROMUSCULAR REEDUCATION: CPT

## 2024-05-13 NOTE — FLOWSHEET NOTE
2 weeks  Independent in HEP and progression per patient tolerance, in order to progress toward full function and prevent re-injury.    Status: [] Progressing: [] Met: [] Not Met: [] Adjusted  Patient will have a decrease in pain to 0/10 to help facilitate improvement in movement, function, and ADLs as indicated by functional deficits.   Status: [] Progressing: [] Met: [] Not Met: [] Adjusted    Long Term Goals: To be achieved in: 4-6 weeks  Disability index score of 7% or less for the WOMAC to assist with return top prior level of function.   Status: [] Progressing: [] Met: [] Not Met: [] Adjusted  Improve ankle AROM to WFL to allow for proper joint functioning as indicated by patients functional deficits.  Status: [] Progressing: [] Met: [] Not Met: [] Adjusted  Pt to improve strength to show motor control/activation of proximal hip, TrA/abdominal, posterior chain LE, quadriceps, gastroc/soleus, and hamstrings to facilitate functional mobility and ADLs.    Status: [] Progressing: [] Met: [] Not Met: [] Adjusted  Patient will return to  Usual recreational activities, squatting, heavy home activities, walk 1 mile, hopping, and running  without increased symptoms or restriction to work towards return to prior level of function.        Status: [] Progressing: [] Met: [] Not Met: [] Adjusted  Patient will resume normal work/leisure activities without pain.            Status: [] Progressing: [] Met: [] Not Met: [] Adjusted    TREATMENT PLAN     Plan: Monitor response. Reassess for possible discharge.  Electronically Signed by Emilie Spence, PT  [id number]  Date: 05/13/2024     Note: Portions of this note have been templated and/or copied from initial evaluation, reassessments and prior notes for documentation efficiency.

## 2024-05-16 ENCOUNTER — HOSPITAL ENCOUNTER (OUTPATIENT)
Dept: PHYSICAL THERAPY | Age: 44
Setting detail: THERAPIES SERIES
Discharge: HOME | End: 2024-05-16
Payer: COMMERCIAL

## 2024-05-16 PROCEDURE — 97110 THERAPEUTIC EXERCISES: CPT

## 2024-05-16 PROCEDURE — 97112 NEUROMUSCULAR REEDUCATION: CPT

## 2024-05-16 PROCEDURE — 97140 MANUAL THERAPY 1/> REGIONS: CPT

## 2024-05-16 NOTE — PLAN OF CARE
[facility]- Outpatient Rehabilitation and Therapy [address] [place] [address] office: [phone] fax: [phone]         Physical Therapy Discharge Summary    Dear Dr. Koch,    We had the pleasure of treating the following patient for physical therapy services at [facility].  A summary of our findings can be found in the discharge summary below.  If you have any questions or concerns regarding these findings, please do not hesitate to contact me at the office phone number above.  Thank you for the referral.     Overall Response to Treatment:   []Patient is responding well to treatment and improvement is noted with regards  to goals   []Patient should continue to improve in reasonable time if they continue HEP   []Patient has plateaued and is no longer responding to skilled PT intervention    []Patient is getting worse and would benefit from return to referring MD   []Patient unable to adhere to initial POC   [x]Other: Patient is independent with HEP, PT goals have been met.    Date range of Visits: 3/11/24 thru 24  Total Visits: 18   Physical Therapy: TREATMENT/PROGRESS NOTE   Patient: Adelso Zamora (43 y.o. male)   Examination Date: 2024   :  1980 MRN: [id number]   Visit #: 18   Insurance Allowable Auth Needed   BMN []Yes    [x]No    Insurance: Payor: CIGNA / Plan: CIGNA / Product Type: *No Product type* / PT Insurance Information: Cigna, 30% coinsurance, visits based on medical necessity, does not require prior authorization   Insurance ID: [id number] - (Commercial)  Secondary Insurance (if applicable):    Treatment Diagnosis:     ICD-10-CM    1. Sprain of anterior talofibular ligament of left ankle, initial encounter  S93.492A          Medical Diagnosis:  Sprain of anterior talofibular ligament of left ankle, initial encounter [S93.492A]   Referring Physician: Loretta Koch MD  PCP: No primary care provider on file.       Plan of care